# Patient Record
Sex: FEMALE | Race: BLACK OR AFRICAN AMERICAN | NOT HISPANIC OR LATINO | URBAN - METROPOLITAN AREA
[De-identification: names, ages, dates, MRNs, and addresses within clinical notes are randomized per-mention and may not be internally consistent; named-entity substitution may affect disease eponyms.]

---

## 2021-09-27 ENCOUNTER — OFFICE VISIT (OUTPATIENT)
Dept: URBAN - METROPOLITAN AREA CLINIC 70 | Facility: CLINIC | Age: 71
End: 2021-09-27

## 2021-11-08 ENCOUNTER — LAB OUTSIDE AN ENCOUNTER (OUTPATIENT)
Dept: URBAN - METROPOLITAN AREA CLINIC 70 | Facility: CLINIC | Age: 71
End: 2021-11-08

## 2021-11-08 ENCOUNTER — WEB ENCOUNTER (OUTPATIENT)
Dept: URBAN - METROPOLITAN AREA CLINIC 70 | Facility: CLINIC | Age: 71
End: 2021-11-08

## 2021-11-08 ENCOUNTER — OFFICE VISIT (OUTPATIENT)
Dept: URBAN - METROPOLITAN AREA CLINIC 70 | Facility: CLINIC | Age: 71
End: 2021-11-08
Payer: COMMERCIAL

## 2021-11-08 VITALS
HEART RATE: 112 BPM | WEIGHT: 136.2 LBS | HEIGHT: 63 IN | TEMPERATURE: 96.1 F | BODY MASS INDEX: 24.13 KG/M2 | DIASTOLIC BLOOD PRESSURE: 80 MMHG | SYSTOLIC BLOOD PRESSURE: 121 MMHG

## 2021-11-08 DIAGNOSIS — K21.9 GASTROESOPHAGEAL REFLUX DISEASE WITHOUT ESOPHAGITIS: ICD-10-CM

## 2021-11-08 DIAGNOSIS — R14.0 BLOATING: ICD-10-CM

## 2021-11-08 DIAGNOSIS — K59.00 COLONIC CONSTIPATION: ICD-10-CM

## 2021-11-08 DIAGNOSIS — Z12.11 ENCOUNTER FOR SCREENING COLONOSCOPY: ICD-10-CM

## 2021-11-08 PROCEDURE — 99204 OFFICE O/P NEW MOD 45 MIN: CPT | Performed by: NURSE PRACTITIONER

## 2021-11-08 RX ORDER — ALPRAZOLAM 0.25 MG/1
1 TABLET TABLET ORAL TWICE A DAY
Status: ACTIVE | COMMUNITY

## 2021-11-08 RX ORDER — METFORMIN HYDROCHLORIDE 500 MG/1
1 TABLET WITH A MEAL TABLET, FILM COATED ORAL TWICE A DAY
Status: ACTIVE | COMMUNITY

## 2021-11-08 RX ORDER — OMEPRAZOLE 40 MG/1
1 CAPSULE 30 MINUTES BEFORE MORNING MEAL CAPSULE, DELAYED RELEASE ORAL ONCE A DAY
Status: ACTIVE | COMMUNITY

## 2021-11-08 RX ORDER — METOPROLOL TARTRATE 25 MG/1
1 TABLET WITH FOOD TABLET, FILM COATED ORAL TWICE A DAY
Status: ACTIVE | COMMUNITY

## 2021-11-08 RX ORDER — LOSARTAN POTASSIUM 25 MG/1
1 TABLET TABLET ORAL ONCE A DAY
Status: ACTIVE | COMMUNITY

## 2021-11-08 RX ORDER — SIMVASTATIN 20 MG/1
1 TABLET IN THE EVENING TABLET, FILM COATED ORAL ONCE A DAY
Status: ACTIVE | COMMUNITY

## 2021-11-08 RX ORDER — OMEPRAZOLE 40 MG/1
1 CAPSULE 30 MINUTES BEFORE MORNING MEAL CAPSULE, DELAYED RELEASE ORAL ONCE A DAY
OUTPATIENT

## 2021-11-08 NOTE — HPI-TODAY'S VISIT:
Patient presents today for evaluation of lower abdominal pain and excessive flatulence that started  over a month.  Pain was primarialy to RLQ region and described as intense pressure.  Epigastric pain also occured that would radiate aroun between shoulder blades.  Symptoms occured independently of eating.  Was evaluated by her GYN who recommended GI evaluation.  Due to persistent gas pressure and pain, later evaluated by primary care provider who ordered abdominal x-ray.  Voices excessive gas noted on imaging.  During this time, noticed some consitpation in which she failed to feel emptied.  Despite defecation occuring daily, may not experience a complete sensation of evacuation.  Has a hx of reflux and is currently on omeoprazole 40 mg daily.  Denies any increase in symptoms during this time.  Eventually took dose of laxatives and Gas-X to induce belching and defecation.  Felt symptoms gradually improved.  Currently resolution of RLQ pain but continues to voice excessive gas/bloating.    Last colonoscopy was over 10 years ago with normal findings.  Denies prior EGD.

## 2021-12-17 ENCOUNTER — OFFICE VISIT (OUTPATIENT)
Dept: URBAN - METROPOLITAN AREA SURGERY CENTER 24 | Facility: SURGERY CENTER | Age: 71
End: 2021-12-17
Payer: COMMERCIAL

## 2021-12-17 ENCOUNTER — CLAIMS CREATED FROM THE CLAIM WINDOW (OUTPATIENT)
Dept: URBAN - METROPOLITAN AREA CLINIC 4 | Facility: CLINIC | Age: 71
End: 2021-12-17
Payer: COMMERCIAL

## 2021-12-17 DIAGNOSIS — R10.13 ABDOMINAL DISCOMFORT, EPIGASTRIC: ICD-10-CM

## 2021-12-17 DIAGNOSIS — K31.89 DUODENAL ERYTHEMA: ICD-10-CM

## 2021-12-17 DIAGNOSIS — K63.5 BENIGN COLON POLYP: ICD-10-CM

## 2021-12-17 DIAGNOSIS — K31.89 ACQUIRED DEFORMITY OF DUODENUM: ICD-10-CM

## 2021-12-17 DIAGNOSIS — K50.00 CROHN'S DISEASE OF SMALL INTESTINE WITHOUT COMPLICATIONS: ICD-10-CM

## 2021-12-17 DIAGNOSIS — K51.40 INFLAMMATORY POLYPS OF COLON WITHOUT COMPLICATIONS: ICD-10-CM

## 2021-12-17 PROCEDURE — 88341 IMHCHEM/IMCYTCHM EA ADD ANTB: CPT | Performed by: PATHOLOGY

## 2021-12-17 PROCEDURE — 45385 COLONOSCOPY W/LESION REMOVAL: CPT | Performed by: INTERNAL MEDICINE

## 2021-12-17 PROCEDURE — 88305 TISSUE EXAM BY PATHOLOGIST: CPT | Performed by: PATHOLOGY

## 2021-12-17 PROCEDURE — 43239 EGD BIOPSY SINGLE/MULTIPLE: CPT | Performed by: INTERNAL MEDICINE

## 2021-12-17 PROCEDURE — 88342 IMHCHEM/IMCYTCHM 1ST ANTB: CPT | Performed by: PATHOLOGY

## 2021-12-17 PROCEDURE — 88312 SPECIAL STAINS GROUP 1: CPT | Performed by: PATHOLOGY

## 2021-12-17 PROCEDURE — G8907 PT DOC NO EVENTS ON DISCHARG: HCPCS | Performed by: INTERNAL MEDICINE

## 2022-02-07 ENCOUNTER — OFFICE VISIT (OUTPATIENT)
Dept: URBAN - METROPOLITAN AREA CLINIC 70 | Facility: CLINIC | Age: 72
End: 2022-02-07
Payer: COMMERCIAL

## 2022-02-07 DIAGNOSIS — Z86.010 PERSONAL HISTORY OF COLONIC POLYPS: ICD-10-CM

## 2022-02-07 DIAGNOSIS — K59.00 COLONIC CONSTIPATION: ICD-10-CM

## 2022-02-07 DIAGNOSIS — K21.9 GASTROESOPHAGEAL REFLUX DISEASE WITHOUT ESOPHAGITIS: ICD-10-CM

## 2022-02-07 DIAGNOSIS — R14.0 BLOATING: ICD-10-CM

## 2022-02-07 PROCEDURE — 99214 OFFICE O/P EST MOD 30 MIN: CPT | Performed by: NURSE PRACTITIONER

## 2022-02-07 RX ORDER — OMEPRAZOLE 40 MG/1
1 CAPSULE 30 MINUTES BEFORE MORNING MEAL CAPSULE, DELAYED RELEASE ORAL ONCE A DAY
OUTPATIENT

## 2022-02-07 RX ORDER — ALPRAZOLAM 0.25 MG/1
1 TABLET TABLET ORAL TWICE A DAY
Status: ACTIVE | COMMUNITY

## 2022-02-07 RX ORDER — SIMVASTATIN 20 MG/1
1 TABLET IN THE EVENING TABLET, FILM COATED ORAL ONCE A DAY
Status: ACTIVE | COMMUNITY

## 2022-02-07 RX ORDER — METOPROLOL TARTRATE 25 MG/1
1 TABLET WITH FOOD TABLET, FILM COATED ORAL TWICE A DAY
Status: ACTIVE | COMMUNITY

## 2022-02-07 RX ORDER — LOSARTAN POTASSIUM 25 MG/1
1 TABLET TABLET ORAL ONCE A DAY
Status: ACTIVE | COMMUNITY

## 2022-02-07 RX ORDER — METFORMIN HYDROCHLORIDE 500 MG/1
1 TABLET WITH A MEAL TABLET, FILM COATED ORAL TWICE A DAY
Status: ACTIVE | COMMUNITY

## 2022-02-07 RX ORDER — OMEPRAZOLE 40 MG/1
1 CAPSULE 30 MINUTES BEFORE MORNING MEAL CAPSULE, DELAYED RELEASE ORAL ONCE A DAY
Status: ACTIVE | COMMUNITY

## 2022-02-07 NOTE — HPI-TODAY'S VISIT:
Patient presents today for procedure follow up after undergoing colonoscopy and EGD on 12/17/2021.   Colonoscopy was performed for screening purposes.  Procedure revealed polypoid non-obstructing mass in cecum that was completely excised.  Biopsies of this consistent with inflammatory pseudopolyps suggestive of Crohns or NSAID induced.  Other findings include sigmoid diverticulosis.  In regards to defecation occur daily (2-3x/day) and voices a complete sensation of evacuation.     EGD showed medium hiatal hernia, gastritis (neg HP) and normal duodenum.  Remains on omeprazole daily but does not feel it's as effective as it was before.  Continues to experience bloating, gas, excessive beching and weight gain over the 3 months.  Eating can contribute to increase bloating and belching.     Patient feels her weight gain is contributed to her change in medication for diabetes as she has noticed inrease in appetite.

## 2022-02-07 NOTE — HPI-OTHER HISTORIES
---Last office note 11/08/2021: Patient presents today for evaluation of lower abdominal pain and excessive flatulence that started  over a month.  Pain was primarialy to RLQ region and described as intense pressure.  Epigastric pain also occured that would radiate aroun between shoulder blades.  Symptoms occured independently of eating.  Was evaluated by her GYN who recommended GI evaluation.  Due to persistent gas pressure and pain, later evaluated by primary care provider who ordered abdominal x-ray.  Voices excessive gas noted on imaging.  During this time, noticed some consitpation in which she failed to feel emptied.  Despite defecation occuring daily, may not experience a complete sensation of evacuation.  Has a hx of reflux and is currently on omeoprazole 40 mg daily.  Denies any increase in symptoms during this time.  Eventually took dose of laxatives and Gas-X to induce belching and defecation.  Felt symptoms gradually improved.  Currently resolution of RLQ pain but continues to voice excessive gas/bloating.    Last colonoscopy was over 10 years ago with normal findings.  Denies prior EGD.

## 2022-03-08 ENCOUNTER — OFFICE VISIT (OUTPATIENT)
Dept: URBAN - METROPOLITAN AREA CLINIC 70 | Facility: CLINIC | Age: 72
End: 2022-03-08
Payer: COMMERCIAL

## 2022-03-08 VITALS
DIASTOLIC BLOOD PRESSURE: 86 MMHG | BODY MASS INDEX: 25.14 KG/M2 | SYSTOLIC BLOOD PRESSURE: 147 MMHG | WEIGHT: 141.9 LBS | HEART RATE: 98 BPM | HEIGHT: 63 IN | TEMPERATURE: 97.2 F

## 2022-03-08 DIAGNOSIS — K59.00 COLONIC CONSTIPATION: ICD-10-CM

## 2022-03-08 DIAGNOSIS — Z86.010 PERSONAL HISTORY OF COLONIC POLYPS: ICD-10-CM

## 2022-03-08 DIAGNOSIS — K21.9 GASTROESOPHAGEAL REFLUX DISEASE WITHOUT ESOPHAGITIS: ICD-10-CM

## 2022-03-08 PROCEDURE — 99213 OFFICE O/P EST LOW 20 MIN: CPT | Performed by: NURSE PRACTITIONER

## 2022-03-08 RX ORDER — FAMOTIDINE 40 MG/1
1 TABLET AT BEDTIME TABLET, FILM COATED ORAL ONCE A DAY
Qty: 90 TABLET | Refills: 1 | OUTPATIENT
Start: 2022-03-08

## 2022-03-08 RX ORDER — METOPROLOL TARTRATE 25 MG/1
1 TABLET WITH FOOD TABLET, FILM COATED ORAL TWICE A DAY
Status: ACTIVE | COMMUNITY

## 2022-03-08 RX ORDER — LOSARTAN POTASSIUM 25 MG/1
1 TABLET TABLET ORAL ONCE A DAY
Status: ACTIVE | COMMUNITY

## 2022-03-08 RX ORDER — METFORMIN HYDROCHLORIDE 500 MG/1
1 TABLET WITH A MEAL TABLET, FILM COATED ORAL TWICE A DAY
Status: ACTIVE | COMMUNITY

## 2022-03-08 RX ORDER — SIMVASTATIN 20 MG/1
1 TABLET IN THE EVENING TABLET, FILM COATED ORAL ONCE A DAY
Status: ACTIVE | COMMUNITY

## 2022-03-08 RX ORDER — OMEPRAZOLE 40 MG/1
1 CAPSULE 30 MINUTES BEFORE MORNING MEAL CAPSULE, DELAYED RELEASE ORAL ONCE A DAY
Qty: 90 | Refills: 1

## 2022-03-08 RX ORDER — OMEPRAZOLE 40 MG/1
1 CAPSULE 30 MINUTES BEFORE MORNING MEAL CAPSULE, DELAYED RELEASE ORAL ONCE A DAY
Status: ACTIVE | COMMUNITY

## 2022-03-08 RX ORDER — POLYETHYLENE GLYCOL 3350 17 G/17G
MIX ONE SCOOP IN AT LEAST 8 OZ OF WATER, TEA, COFFEE, JUICE POWDER, FOR SOLUTION ORAL
Qty: 510 GRAMS | Refills: 5 | OUTPATIENT
Start: 2022-03-08 | End: 2022-09-04

## 2022-03-08 RX ORDER — ALPRAZOLAM 0.25 MG/1
1 TABLET TABLET ORAL TWICE A DAY
Status: ACTIVE | COMMUNITY

## 2022-03-08 NOTE — HPI-TODAY'S VISIT:
Patient presents today for follow up in regards to GERD and constipation.  It was recommended she start nightly pepcid for breakthrough dyspepsia.  However, unable to obtain prescription for medication.  Continues to take omeprazole daily but experiences increase bloating and belching.  In regards to constipation, defecation occurs daily and voices a complete sensation of evacuation.  Has not started Miralax regimen as recommended after LOV.
No

## 2022-03-08 NOTE — HPI-OTHER HISTORIES
--Last office note 02/07/2022: Patient presents today for procedure follow up after undergoing colonoscopy and EGD on 12/17/2021.   Colonoscopy was performed for screening purposes.  Procedure revealed polypoid non-obstructing mass in cecum that was completely excised.  Biopsies of this consistent with inflammatory pseudopolyps suggestive of Crohns or NSAID induced.  Other findings include sigmoid diverticulosis.  In regards to defecation occur daily (2-3x/day) and voices a complete sensation of evacuation.     EGD showed medium hiatal hernia, gastritis (neg HP) and normal duodenum.  Remains on omeprazole daily but does not feel it's as effective as it was before.  Continues to experience bloating, gas, excessive beching and weight gain over the 3 months.  Eating can contribute to increase bloating and belching.     Patient feels her weight gain is contributed to her change in medication for diabetes as she has noticed inrease in appetite.

## 2022-03-10 ENCOUNTER — TELEPHONE ENCOUNTER (OUTPATIENT)
Dept: URBAN - METROPOLITAN AREA CLINIC 70 | Facility: CLINIC | Age: 72
End: 2022-03-10

## 2022-03-10 RX ORDER — OMEPRAZOLE 40 MG/1
1 CAPSULE 30 MINUTES BEFORE MORNING MEAL CAPSULE, DELAYED RELEASE ORAL ONCE A DAY
Qty: 90 | Refills: 1

## 2022-04-12 ENCOUNTER — LAB OUTSIDE AN ENCOUNTER (OUTPATIENT)
Dept: URBAN - METROPOLITAN AREA CLINIC 70 | Facility: CLINIC | Age: 72
End: 2022-04-12

## 2022-04-12 ENCOUNTER — OFFICE VISIT (OUTPATIENT)
Dept: URBAN - METROPOLITAN AREA CLINIC 70 | Facility: CLINIC | Age: 72
End: 2022-04-12
Payer: COMMERCIAL

## 2022-04-12 DIAGNOSIS — K59.00 COLONIC CONSTIPATION: ICD-10-CM

## 2022-04-12 DIAGNOSIS — Z86.010 PERSONAL HISTORY OF COLONIC POLYPS: ICD-10-CM

## 2022-04-12 DIAGNOSIS — R11.0 NAUSEA: ICD-10-CM

## 2022-04-12 DIAGNOSIS — R10.31 RLQ ABDOMINAL PAIN: ICD-10-CM

## 2022-04-12 DIAGNOSIS — K21.9 GASTROESOPHAGEAL REFLUX DISEASE WITHOUT ESOPHAGITIS: ICD-10-CM

## 2022-04-12 PROCEDURE — 99214 OFFICE O/P EST MOD 30 MIN: CPT | Performed by: NURSE PRACTITIONER

## 2022-04-12 RX ORDER — OMEPRAZOLE 40 MG/1
1 CAPSULE 30 MINUTES BEFORE MORNING MEAL CAPSULE, DELAYED RELEASE ORAL ONCE A DAY
OUTPATIENT

## 2022-04-12 RX ORDER — METOPROLOL TARTRATE 25 MG/1
1 TABLET WITH FOOD TABLET, FILM COATED ORAL TWICE A DAY
Status: ACTIVE | COMMUNITY

## 2022-04-12 RX ORDER — OMEPRAZOLE 40 MG/1
1 CAPSULE 30 MINUTES BEFORE MORNING MEAL CAPSULE, DELAYED RELEASE ORAL ONCE A DAY
Qty: 90 | Refills: 1 | Status: ACTIVE | COMMUNITY

## 2022-04-12 RX ORDER — SIMVASTATIN 20 MG/1
1 TABLET IN THE EVENING TABLET, FILM COATED ORAL ONCE A DAY
Status: ACTIVE | COMMUNITY

## 2022-04-12 RX ORDER — LOSARTAN POTASSIUM 25 MG/1
1 TABLET TABLET ORAL ONCE A DAY
Status: ACTIVE | COMMUNITY

## 2022-04-12 RX ORDER — METFORMIN HYDROCHLORIDE 500 MG/1
1 TABLET WITH A MEAL TABLET, FILM COATED ORAL TWICE A DAY
Status: ACTIVE | COMMUNITY

## 2022-04-12 RX ORDER — FAMOTIDINE 40 MG/1
1 TABLET AT BEDTIME TABLET, FILM COATED ORAL ONCE A DAY
Qty: 90 TABLET | Refills: 1 | Status: ACTIVE | COMMUNITY
Start: 2022-03-08

## 2022-04-12 RX ORDER — FAMOTIDINE 40 MG/1
1 TABLET AT BEDTIME TABLET, FILM COATED ORAL ONCE A DAY
OUTPATIENT
Start: 2022-03-08

## 2022-04-12 RX ORDER — POLYETHYLENE GLYCOL 3350 17 G/17G
MIX ONE SCOOP IN AT LEAST 8 OZ OF WATER, TEA, COFFEE, JUICE POWDER, FOR SOLUTION ORAL
Qty: 510 GRAMS | Refills: 5 | Status: ACTIVE | COMMUNITY
Start: 2022-03-08 | End: 2022-09-04

## 2022-04-12 RX ORDER — ALPRAZOLAM 0.25 MG/1
1 TABLET TABLET ORAL TWICE A DAY
Status: ACTIVE | COMMUNITY

## 2022-04-12 RX ORDER — POLYETHYLENE GLYCOL 3350 17 G/17G
MIX ONE SCOOP IN AT LEAST 8 OZ OF WATER, TEA, COFFEE, JUICE POWDER, FOR SOLUTION ORAL
OUTPATIENT

## 2022-04-12 NOTE — PHYSICAL EXAM GASTROINTESTINAL
Abdomen , soft, RLQ and RUQ tenderness, no rebound tenderness, nondistended , no guarding or rigidity , no masses palpable , normal bowel sounds , Liver and Spleen:  no hepatosplenomegaly

## 2022-04-12 NOTE — HPI-OTHER HISTORIES
---------------------- Last office note 03/08/2022: Patient presents today for follow up in regards to GERD and constipation.  It was recommended she start nightly pepcid for breakthrough dyspepsia.  However, unable to obtain prescription for medication.  Continues to take omeprazole daily but experiences increase bloating and belching.  In regards to constipation, defecation occurs daily and voices a complete sensation of evacuation.  Has not started Miralax regimen as recommended after LOV.

## 2022-04-12 NOTE — HPI-TODAY'S VISIT:
Presents today for f/u in regards to GERD and for evaluation of RLQ pain that started 3 days ago.  Famotidine was added at bedtime to help improve GERD symptoms.  Feels taking omeprazole qam and famotidine at HS have helped to control symptoms of GERD.  Her pain to RLQ started about 3 days ago as a constant, hurting pain.  Denies any aggravating or alleviating factors.  Nausea without vomiting started this morning.  Denies fever, chills diarrhea.  Constipation still remains with last BM voiced as being one day ago. Stool were small and did not experience a complete sensation of evacuation.  Denies defecation improving or lessening pain.  Has not had recent imaging.

## 2022-04-25 ENCOUNTER — TELEPHONE ENCOUNTER (OUTPATIENT)
Dept: URBAN - METROPOLITAN AREA CLINIC 70 | Facility: CLINIC | Age: 72
End: 2022-04-25

## 2022-05-17 ENCOUNTER — OFFICE VISIT (OUTPATIENT)
Dept: URBAN - METROPOLITAN AREA CLINIC 70 | Facility: CLINIC | Age: 72
End: 2022-05-17
Payer: COMMERCIAL

## 2022-05-17 ENCOUNTER — DASHBOARD ENCOUNTERS (OUTPATIENT)
Age: 72
End: 2022-05-17

## 2022-05-17 DIAGNOSIS — K21.9 GASTROESOPHAGEAL REFLUX DISEASE WITHOUT ESOPHAGITIS: ICD-10-CM

## 2022-05-17 DIAGNOSIS — Z86.010 PERSONAL HISTORY OF COLONIC POLYPS: ICD-10-CM

## 2022-05-17 DIAGNOSIS — K59.09 CHANGE IN BOWEL MOVEMENTS INTERMITTENT CONSTIPATION. URGENCY IN THE MORNING.: ICD-10-CM

## 2022-05-17 PROBLEM — 266435005: Status: ACTIVE | Noted: 2021-11-08

## 2022-05-17 PROBLEM — 35298007: Status: ACTIVE | Noted: 2021-11-08

## 2022-05-17 PROBLEM — 428283002: Status: ACTIVE | Noted: 2022-02-13

## 2022-05-17 PROCEDURE — 99213 OFFICE O/P EST LOW 20 MIN: CPT | Performed by: INTERNAL MEDICINE

## 2022-05-17 RX ORDER — SIMVASTATIN 20 MG/1
1 TABLET IN THE EVENING TABLET, FILM COATED ORAL ONCE A DAY
Status: ACTIVE | COMMUNITY

## 2022-05-17 RX ORDER — POLYETHYLENE GLYCOL 3350 17 G/17G
MIX ONE SCOOP IN AT LEAST 8 OZ OF WATER, TEA, COFFEE, JUICE POWDER, FOR SOLUTION ORAL
Status: ACTIVE | COMMUNITY

## 2022-05-17 RX ORDER — ALPRAZOLAM 0.25 MG/1
1 TABLET TABLET ORAL TWICE A DAY
Status: ACTIVE | COMMUNITY

## 2022-05-17 RX ORDER — POLYETHYLENE GLYCOL 3350 17 G/17G
MIX ONE SCOOP IN AT LEAST 8 OZ OF WATER, TEA, COFFEE, JUICE POWDER, FOR SOLUTION ORAL
OUTPATIENT

## 2022-05-17 RX ORDER — OMEPRAZOLE 40 MG/1
1 CAPSULE 30 MINUTES BEFORE MORNING MEAL CAPSULE, DELAYED RELEASE ORAL ONCE A DAY
OUTPATIENT

## 2022-05-17 RX ORDER — FAMOTIDINE 40 MG/1
1 TABLET AT BEDTIME TABLET, FILM COATED ORAL ONCE A DAY
OUTPATIENT

## 2022-05-17 RX ORDER — LOSARTAN POTASSIUM 25 MG/1
1 TABLET TABLET ORAL ONCE A DAY
Status: ACTIVE | COMMUNITY

## 2022-05-17 RX ORDER — METFORMIN HYDROCHLORIDE 500 MG/1
1 TABLET WITH A MEAL TABLET, FILM COATED ORAL TWICE A DAY
Status: ACTIVE | COMMUNITY

## 2022-05-17 RX ORDER — OMEPRAZOLE 40 MG/1
1 CAPSULE 30 MINUTES BEFORE MORNING MEAL CAPSULE, DELAYED RELEASE ORAL ONCE A DAY
Status: ACTIVE | COMMUNITY

## 2022-05-17 RX ORDER — METOPROLOL TARTRATE 25 MG/1
1 TABLET WITH FOOD TABLET, FILM COATED ORAL TWICE A DAY
Status: ACTIVE | COMMUNITY

## 2022-05-17 RX ORDER — FAMOTIDINE 40 MG/1
1 TABLET AT BEDTIME TABLET, FILM COATED ORAL ONCE A DAY
Status: ACTIVE | COMMUNITY
Start: 2022-03-08

## 2022-05-17 NOTE — HPI-TODAY'S VISIT:
Presents for f/u regarding RLQ pain and bloating.  Feels addition of famotidine at bedtime and daily fiber supplements have improved her complaints.  Defecation occurs daily and voices a complete sense of evacuation.  Has noticed resolution of indigestion as well.  CT A/P on 04/18/2022:  fatty liver and small to moderate hiatal hernia.  EGD in December 2021 revealed similar findings.  Colonoscopy 12/2021:  Polypoid non-obstructing mass in cecum that was completely excised.  Biopsies of this consistent with inflammatory pseudopolyps suggestive of Crohns or NSAID induced.  Currently w/o complaints.

## 2022-05-17 NOTE — HPI-OTHER HISTORIES
----------------------------------- Last office note 04/12/2022: Presents today for f/u in regards to GERD and for evaluation of RLQ pain that started 3 days ago.  Famotidine was added at bedtime to help improve GERD symptoms.  Feels taking omeprazole qam and famotidine at HS have helped to control symptoms of GERD.  Her pain to RLQ started about 3 days ago as a constant, hurting pain.  Denies any aggravating or alleviating factors.  Nausea without vomiting started this morning.  Denies fever, chills diarrhea.  Constipation still remains with last BM voiced as being one day ago. Stool were small and did not experience a complete sensation of evacuation.  Denies defecation improving or lessening pain.  Has not had recent imaging.

## 2022-10-04 ENCOUNTER — ERX REFILL RESPONSE (OUTPATIENT)
Dept: URBAN - METROPOLITAN AREA CLINIC 70 | Facility: CLINIC | Age: 72
End: 2022-10-04

## 2022-10-04 RX ORDER — FAMOTIDINE 40 MG/1
1 TABLET AT BEDTIME TABLET, FILM COATED ORAL ONCE A DAY
OUTPATIENT

## 2022-10-04 RX ORDER — FAMOTIDINE 40 MG/1
TAKE 1 TABLET TABLET, FILM COATED ORAL AT BEDTIME
Qty: 90 | Refills: 0 | OUTPATIENT

## 2023-02-21 ENCOUNTER — ERX REFILL RESPONSE (OUTPATIENT)
Dept: URBAN - METROPOLITAN AREA CLINIC 70 | Facility: CLINIC | Age: 73
End: 2023-02-21

## 2023-02-21 RX ORDER — FAMOTIDINE 40 MG/1
TAKE 1 TABLET TABLET, FILM COATED ORAL AT BEDTIME
Qty: 90 | Refills: 0 | OUTPATIENT

## 2023-02-21 RX ORDER — FAMOTIDINE 40 MG/1
TAKE 1 TABLET BY MOUTH AT BEDTIME TABLET, FILM COATED ORAL
Qty: 90 TABLET | Refills: 0 | OUTPATIENT